# Patient Record
Sex: FEMALE | URBAN - METROPOLITAN AREA
[De-identification: names, ages, dates, MRNs, and addresses within clinical notes are randomized per-mention and may not be internally consistent; named-entity substitution may affect disease eponyms.]

---

## 2019-11-19 NOTE — TELEPHONE ENCOUNTER
RECORDS RECEIVED FROM: Self    DATE RECEIVED: 1/9/20   NOTES (FOR ALL VISITS) STATUS DETAILS   OFFICE NOTE from referring provider N/A    OFFICE NOTE from other specialist N/A    DISCHARGE SUMMARY from hospital N/A    DISCHARGE REPORT from the ER N/A    OPERATIVE REPORT N/A    MEDICATION LIST Care Everywhere    IMAGING  (FOR ALL VISITS)     EMG N/A    EEG N/A    ECT N/A    MRI (HEAD, NECK, SPINE) N/A    LUMBAR PUNCTURE N/A    MARTHA Scan N/A    CT (HEAD, NECK, SPINE) N/A       Phone Call:  11/19/19   Contact Name Ela Warren   Outcome Spoke with patient: Asked if patient has external neuro records. Patient stated she has no neurology records regarding tremors. She did see a neurologist for headaches awhile ago.     Patient stated she asked Dr. Asif Castellon who she should see for tremors and he suggested our movement disorders group here. Therefore patient is a self referral. No external records.

## 2019-12-16 PROBLEM — R25.1 TREMOR: Status: ACTIVE | Noted: 2019-12-16

## 2019-12-16 RX ORDER — BENZONATATE 100 MG/1
100 CAPSULE ORAL
COMMUNITY
Start: 2019-10-21 | End: 2020-01-09

## 2019-12-16 RX ORDER — IPRATROPIUM BROMIDE 42 UG/1
84 SPRAY, METERED NASAL
COMMUNITY
Start: 2019-10-21 | End: 2020-01-09

## 2019-12-17 NOTE — PROGRESS NOTES
Summary and Recommendations:     Essential Tremor  Trial of propranolol 10 mg tab as needed    Discussed diagnosis and management.    Will try propranolol 10mg as needed    Should go back to pcp to annual physical and blood work including TSH.    Discussed medication options, surgical options and devices (wrist devices/watch/etc) for tremor      Gordo Solis MD  _____________________________________________________________________  PATIENT: Ela Warren  49 year old female   : 1970  VIVIAN:     Consult requested by pcp/other        Outside records reviewed and revealed  - inserted.       History obtained from patient      History of Present Illness  49 year old yo right handed with tremor   Right handed and has more left than right hand tremor    Sister-in-law is a physician    Hearing is okay  Vision okay  Snores -   No talking   No change in her sense  Shaky handwriting    Balance has been okay -   Works out  Use to do yoga  Not clear if tremor is worse after exertion  Worse tremor after being hungry  1-2  Cups coffee per day  Sleep okay  Mood - none  Lungs okay  Heart has been okay  Her blood pressure has been low in the past and has had bp in the 80s and gets light headed easily  eg when standing up from yoga  Allergies - see list  Id - none  No family history of tremor.   msk - past issues  Running - stopped and improved.   Skin  - rosacea in the past. Resolved  Endo - ?prediabetic in the past and is no longer prediabetic  Thyroid and cholesterol ok     [facility]  - private clinic     - during the day   She is not typing or writing too much  Typing some - works [place].     Migraines are gone as of 10 years ago.   No family history of migraines.   On hormones and was started 2 years         Medications            Benzonatate tessalon 100mg Not taking       Cetirizine zyrtec Not taking       Cholecalciferol vitamin d3 1000 units  1       Cyclosporine restasis  Not  using       Estradiol  Not using       Estrogen con-medroxyprogesteroneprempro 0.3-1.5mg  Not using       Fluticasone flonase 50 mcg/act nasal spray As needed       Fyavolv 1-5-mcg tablet 1       Hydrocodone-acetaminophen norco 5-325gm Not using       Ibuprofen advil/motrin 600mg Not using       Ipratropium atrovent 0.06% nasal spray  Not using       Metronidazole metrogel 0.75 % gel Not using       MVI 1                                                                                                                     14 Review of systems  are negative except for   Patient Active Problem List   Diagnosis     iamLUMBAR DISC DISPLACEMENT     Nonallopathic lesion of lumbar region     Sprain and strain of hip and thigh     Hip joint pain     Ankle pain     Right hip pain     Tremor        Allergies   Allergen Reactions     Prochlorperazine Swelling, Difficulty breathing and Anaphylaxis     Throat swelled     Past Surgical History:   Procedure Laterality Date     BREAST SURGERY      Augmentation     DILATION AND CURETTAGE, OPERATIVE HYSTEROSCOPY WITH MORCELLATOR, COMBINED N/A 5/6/2016    Procedure: COMBINED DILATION AND CURETTAGE, OPERATIVE HYSTEROSCOPY WITH MORCELLATOR;  Surgeon: Ayse Nolasco MD;  Location: [facility]     GYN SURGERY      ENDOMETRIAL ABLATION, LAPAROSCOPY FOR ENDOMETRIOSIS     Past Medical History:   Diagnosis Date     Allergic rhinitis      Endometriosis      PCOS (polycystic ovarian syndrome)      Prediabetes      Rosacea      Seasonal allergies      Tremor 12/16/2019     Social History     Socioeconomic History     Marital status:      Spouse name: Not on file     Number of children: Not on file     Years of education: Not on file     Highest education level: Not on file   Occupational History     Not on file   Social Needs     Financial resource strain: Not on file     Food insecurity:     Worry: Not on file     Inability: Not on file     Transportation needs:     Medical: Not on  file     Non-medical: Not on file   Tobacco Use     Smoking status: Never Smoker     Smokeless tobacco: Never Used   Substance and Sexual Activity     Alcohol use: Yes     Comment: 2-4 times/ week     Drug use: No     Sexual activity: Not on file   Lifestyle     Physical activity:     Days per week: Not on file     Minutes per session: Not on file     Stress: Not on file   Relationships     Social connections:     Talks on phone: Not on file     Gets together: Not on file     Attends Mu-ism service: Not on file     Active member of club or organization: Not on file     Attends meetings of clubs or organizations: Not on file     Relationship status: Not on file     Intimate partner violence:     Fear of current or ex partner: Not on file     Emotionally abused: Not on file     Physically abused: Not on file     Forced sexual activity: Not on file   Other Topics Concern     Not on file   Social History Narrative     lives in [place] and venkat spouse     No family history on file.  Current Outpatient Medications   Medication Sig Dispense Refill     benzonatate (TESSALON) 100 MG capsule Take 100 mg by mouth       ipratropium (ATROVENT) 0.06 % nasal spray 84 mcg       Cetirizine HCl (ZYRTEC PO) Take  by mouth.       Cholecalciferol (VITAMIN D3 PO) Take 1,000 Units by mouth daily       CycloSPORINE (RESTASIS OP) Apply  to eye.       ESTRADIOL TD        estrogen conj-medroxyPROGESTERone (PREMPRO) 0.3-1.5 MG tablet Take 1 tablet by mouth       fluticasone (FLONASE) 50 MCG/ACT nasal spray Spray 2 sprays into both nostrils daily       HYDROcodone-acetaminophen (NORCO) 5-325 MG per tablet Take 1-2 tablets by mouth every 4 hours as needed for other (Moderate to Severe Pain) 15 tablet 0     ibuprofen (ADVIL,MOTRIN) 600 MG tablet Take 1 tablet (600 mg) by mouth every 6 hours as needed for pain (mild) 60 tablet 5     metroNIDAZOLE (METROGEL) 0.75 % gel Apply topically 2 times daily       multivitamin, therapeutic  with minerals (THERA-VIT-M) TABS Take 1 tablet by mouth daily       ORDER FOR DME Equipment being ordered: Tri[employer]ok ankle brace, medium 1 each 0     Examination  B/P: Data Unavailable, T: Data Unavailable, P: Data Unavailable, R: Data Unavailable 0 lbs 0 oz  There were no vitals taken for this visit., There is no height or weight on file to calculate BMI.    Vitals signs were added and reviewed if not above. Please refer to the chart from this visit.    General examination: well developed, nourished and normal affect  Carotid: No bruits. Chest CTA, Heart regular without gallops or murmurs. Abdomen soft nontender, no masses, bowel sounds intact. Periphery: normal pulses without edema. No skin lesions. MENTAL STATUS:  Alert, oriented x3.  Speech fluent with normal naming, repetition, comprehension.  Good right-left orientation, Can remember 3/3 objects.   CRANIAL NERVES:  Disks flat. Pupils are equal, round, reactive to light.  Normal vascularity and fields. Extraocular movements full.  Facial sensation and movement normal.  Hearing intact. Palate moves symmetrically.  Tongue midline.  Sternocleidomastoid and trapezius strength intact.  Neck strength was normal.  NEUROLOGIC:  Tone: normal. Motor in upper and lower extremities. 5/5.  Reflexes 2/4.  Toe signs downgoing.  Good finger-nose-finger, fine finger movement, heel-shin maneuver, sensation to light touch, position sense and vibration and temperature was normal. Gait normal. Romberg and postural stability normal Tremor  - mild bilateral postural and action tremor.        Patient Demographics  - 50 y.o. Female; born [birth year]   Patient Address Communication Language Race / Ethnicity Marital Status   [address] [place] [address] [phone] (Mobile)  [phone] (Home) English (Preferred) White / Not  or  Unknown     Allergies    Active Allergy Reactions Severity Noted Date Comments   Prochlorperazine Anaphylaxis    12/18/2014       Medications    Medication Sig Dispensed Refills Start Date End Date Status   conjugated estrogen-medroxyPROGESTERone 0.3-1.5 mg 0.3-1.5 mg tablet   Take 1 tablet by mouth once daily.   0     Active   ESTRADIOL TD   Apply to skin.   0     Active   benzonatate (TESSALON) 100 mg oral capsule   Take 1 capsule (100 mg) by mouth three times a day. 30 capsule   0 10/21/2019   Active   ipratropium 0.06% (ATROVENT) 42 mcg (0.06 %) Nasal Spray Spray nasal spray   Instill 2 sprays (84 mcg) into EACH nare three times a day. 15 mL   0 10/21/2019   Active     Active Problems    No known active problems      Encounters  - from Last 3 Months  Date Type Specialty Care Team Description   10/21/2019 Urgent Care Visit Urgent Care Saige Julio, PAJannaC   Viral URI with cough (Primary Dx)   10/21/2019 Travel           Social History    Tobacco Use Types Packs/Day Years Used Date   Never Smoker           Smokeless Tobacco: Never Used           Alcohol Use Drinks/Week oz/Week Comments   No           Sex Assigned at Birth Date Recorded   Not on file       Job Start Date Occupation Industry   Not on file Not on file Not on file     Travel History Travel Start Travel End   No recent travel history available.

## 2020-01-09 ENCOUNTER — PRE VISIT (OUTPATIENT)
Dept: NEUROLOGY | Facility: CLINIC | Age: 50
End: 2020-01-09

## 2020-01-09 ENCOUNTER — OFFICE VISIT (OUTPATIENT)
Dept: NEUROLOGY | Facility: CLINIC | Age: 50
End: 2020-01-09
Payer: COMMERCIAL

## 2020-01-09 VITALS
WEIGHT: 137.8 LBS | RESPIRATION RATE: 15 BRPM | HEIGHT: 69 IN | SYSTOLIC BLOOD PRESSURE: 134 MMHG | OXYGEN SATURATION: 96 % | BODY MASS INDEX: 20.41 KG/M2 | DIASTOLIC BLOOD PRESSURE: 80 MMHG | HEART RATE: 73 BPM

## 2020-01-09 DIAGNOSIS — R25.1 TREMOR: ICD-10-CM

## 2020-01-09 RX ORDER — PROPRANOLOL HYDROCHLORIDE 10 MG/1
TABLET ORAL
Qty: 90 TABLET | Refills: 3 | Status: SHIPPED | OUTPATIENT
Start: 2020-01-09 | End: 2021-02-17

## 2020-01-09 RX ORDER — NORETHINDRONE ACETATE AND ETHINYL ESTRADIOL .005; 1 MG/1; MG/1
TABLET, FILM COATED ORAL
COMMUNITY
Start: 2019-11-15

## 2020-01-09 ASSESSMENT — PAIN SCALES - GENERAL: PAINLEVEL: NO PAIN (0)

## 2020-01-09 ASSESSMENT — MIFFLIN-ST. JEOR: SCORE: 1306.5

## 2020-01-09 NOTE — NURSING NOTE
Chief Complaint   Patient presents with     Consult For     Tremors     P NEW MOVEMENT DISORDER BILATERAL HAND TREMORS, LEFT SIDE WORSE       Werner Wilkerson, EMT

## 2020-01-09 NOTE — LETTER
Date:January 10, 2020      Patient was self referred, no letter generated. Do not send.        [facility] Information

## 2020-01-09 NOTE — LETTER
2020       RE: Ela Warren  [address] [place] [address]     Dear Colleague,    Thank you for referring your patient, Ela Warren, to the [facility] NEUROLOGY at [facility]. Please see a copy of my visit note below.    Summary and Recommendations:     Essential Tremor  Trial of propranolol 10 mg tab as needed    Discussed diagnosis and management.    Will try propranolol 10mg as needed    Should go back to pcp to annual physical and blood work including TSH.    Discussed medication options, surgical options and devices (wrist devices/watch/etc) for tremor      Gordo Solis MD  _____________________________________________________________________  PATIENT: Ela Warren  49 year old female   : 1970  VIVIAN:     Consult requested by pcp/other        Outside records reviewed and revealed  - inserted.       History obtained from patient      History of Present Illness  49 year old yo right handed with tremor   Right handed and has more left than right hand tremor    Sister-in-law is a physician    Hearing is okay  Vision okay  Snores -   No talking   No change in her sense  Shaky handwriting    Balance has been okay -   Works out  Use to do yoga  Not clear if tremor is worse after exertion  Worse tremor after being hungry  1-2  Cups coffee per day  Sleep okay  Mood - none  Lungs okay  Heart has been okay  Her blood pressure has been low in the past and has had bp in the 80s and gets light headed easily  eg when standing up from yoga  Allergies - see list  Id - none  No family history of tremor.   msk - past issues  Running - stopped and improved.   Skin  - rosacea in the past. Resolved  Endo - ?prediabetic in the past and is no longer prediabetic  Thyroid and cholesterol okay     [facility]  - private clinic     - during the day   She is not typing or writing too much  Typing some - works downtown.      Migraines are gone as of 10 years ago.   No family history of migraines.   On hormones and was started 2 years         Medications            Benzonatate tessalon 100mg Not taking       Cetirizine zyrtec Not taking       Cholecalciferol vitamin d3 1000 units  1       Cyclosporine restasis  Not using       Estradiol  Not using       Estrogen con-medroxyprogesteroneprempro 0.3-1.5mg  Not using       Fluticasone flonase 50 mcg/act nasal spray As needed       Fyavolv 1-5-mcg tablet 1       Hydrocodone-acetaminophen norco 5-325gm Not using       Ibuprofen advil/motrin 600mg Not using       Ipratropium atrovent 0.06% nasal spray  Not using       Metronidazole metrogel 0.75 % gel Not using       MVI 1                                                                                                                     14 Review of systems  are negative except for   Patient Active Problem List   Diagnosis     iamLUMBAR DISC DISPLACEMENT     Nonallopathic lesion of lumbar region     Sprain and strain of hip and thigh     Hip joint pain     Ankle pain     Right hip pain     Tremor        Allergies   Allergen Reactions     Prochlorperazine Swelling, Difficulty breathing and Anaphylaxis     Throat swelled     Past Surgical History:   Procedure Laterality Date     BREAST SURGERY      Augmentation     DILATION AND CURETTAGE, OPERATIVE HYSTEROSCOPY WITH MORCELLATOR, COMBINED N/A 5/6/2016    Procedure: COMBINED DILATION AND CURETTAGE, OPERATIVE HYSTEROSCOPY WITH MORCELLATOR;  Surgeon: Ayse Nolasco MD;  Location: [facility]     GYN SURGERY      ENDOMETRIAL ABLATION, LAPAROSCOPY FOR ENDOMETRIOSIS     Past Medical History:   Diagnosis Date     Allergic rhinitis      Endometriosis      PCOS (polycystic ovarian syndrome)      Prediabetes      Rosacea      Seasonal allergies      Tremor 12/16/2019     Social History     Socioeconomic History     Marital status:      Spouse name: Not on file     Number of children: Not  on file     Years of education: Not on file     Highest education level: Not on file   Occupational History     Not on file   Social Needs     Financial resource strain: Not on file     Food insecurity:     Worry: Not on file     Inability: Not on file     Transportation needs:     Medical: Not on file     Non-medical: Not on file   Tobacco Use     Smoking status: Never Smoker     Smokeless tobacco: Never Used   Substance and Sexual Activity     Alcohol use: Yes     Comment: 2-4 times/ week     Drug use: No     Sexual activity: Not on file   Lifestyle     Physical activity:     Days per week: Not on file     Minutes per session: Not on file     Stress: Not on file   Relationships     Social connections:     Talks on phone: Not on file     Gets together: Not on file     Attends Mandaen service: Not on file     Active member of club or organization: Not on file     Attends meetings of clubs or organizations: Not on file     Relationship status: Not on file     Intimate partner violence:     Fear of current or ex partner: Not on file     Emotionally abused: Not on file     Physically abused: Not on file     Forced sexual activity: Not on file   Other Topics Concern     Not on file   Social History Narrative     lives in [place] and venkat spouse     No family history on file.  Current Outpatient Medications   Medication Sig Dispense Refill     benzonatate (TESSALON) 100 MG capsule Take 100 mg by mouth       ipratropium (ATROVENT) 0.06 % nasal spray 84 mcg       Cetirizine HCl (ZYRTEC PO) Take  by mouth.       Cholecalciferol (VITAMIN D3 PO) Take 1,000 Units by mouth daily       CycloSPORINE (RESTASIS OP) Apply  to eye.       ESTRADIOL TD        estrogen conj-medroxyPROGESTERone (PREMPRO) 0.3-1.5 MG tablet Take 1 tablet by mouth       fluticasone (FLONASE) 50 MCG/ACT nasal spray Spray 2 sprays into both nostrils daily       HYDROcodone-acetaminophen (NORCO) 5-325 MG per tablet Take 1-2 tablets by mouth every  4 hours as needed for other (Moderate to Severe Pain) 15 tablet 0     ibuprofen (ADVIL,MOTRIN) 600 MG tablet Take 1 tablet (600 mg) by mouth every 6 hours as needed for pain (mild) 60 tablet 5     metroNIDAZOLE (METROGEL) 0.75 % gel Apply topically 2 times daily       multivitamin, therapeutic with minerals (THERA-VIT-M) TABS Take 1 tablet by mouth daily       ORDER FOR DME Equipment being ordered: Tri[employer]ok ankle brace, medium 1 each 0     Examination  B/P: Data Unavailable, T: Data Unavailable, P: Data Unavailable, R: Data Unavailable 0 lbs 0 oz  There were no vitals taken for this visit., There is no height or weight on file to calculate BMI.    Vitals signs were added and reviewed if not above. Please refer to the chart from this visit.    General examination: well developed, nourished and normal affect  Carotid: No bruits. Chest CTA, Heart regular without gallops or murmurs. Abdomen soft nontender, no masses, bowel sounds intact. Periphery: normal pulses without edema. No skin lesions. MENTAL STATUS:  Alert, oriented x3.  Speech fluent with normal naming, repetition, comprehension.  Good right-left orientation, Can remember 3/3 objects.   CRANIAL NERVES:  Disks flat. Pupils are equal, round, reactive to light.  Normal vascularity and fields. Extraocular movements full.  Facial sensation and movement normal.  Hearing intact. Palate moves symmetrically.  Tongue midline.  Sternocleidomastoid and trapezius strength intact.  Neck strength was normal.  NEUROLOGIC:  Tone: normal. Motor in upper and lower extremities. 5/5.  Reflexes 2/4.  Toe signs downgoing.  Good finger-nose-finger, fine finger movement, heel-shin maneuver, sensation to light touch, position sense and vibration and temperature was normal. Gait normal. Romberg and postural stability normal Tremor  - mild bilateral postural and action tremor.        Patient Demographics  - 50 y.o. Female; born [birth year]   Patient Address Communication  Language Race / Ethnicity Marital Status   [address] [place] [address] [phone] (Mobile)  [phone] (Home) English (Preferred) White / Not  or  Unknown     Allergies    Active Allergy Reactions Severity Noted Date Comments   Prochlorperazine Anaphylaxis   12/18/2014       Medications    Medication Sig Dispensed Refills Start Date End Date Status   conjugated estrogen-medroxyPROGESTERone 0.3-1.5 mg 0.3-1.5 mg tablet   Take 1 tablet by mouth once daily.   0     Active   ESTRADIOL TD   Apply to skin.   0     Active   benzonatate (TESSALON) 100 mg oral capsule   Take 1 capsule (100 mg) by mouth three times a day. 30 capsule   0 10/21/2019   Active   ipratropium 0.06% (ATROVENT) 42 mcg (0.06 %) Nasal Spray Spray nasal spray   Instill 2 sprays (84 mcg) into EACH nare three times a day. 15 mL   0 10/21/2019   Active     Active Problems    No known active problems      Encounters  - from Last 3 Months  Date Type Specialty Care Team Description   10/21/2019 Urgent Care Visit Urgent Care Saige Julio, PAJannaC   Viral URI with cough (Primary Dx)   10/21/2019 Travel           Social History    Tobacco Use Types Packs/Day Years Used Date   Never Smoker           Smokeless Tobacco: Never Used           Alcohol Use Drinks/Week oz/Week Comments   No           Sex Assigned at Birth Date Recorded   Not on file       Job Start Date Occupation Industry   Not on file Not on file Not on file     Travel History Travel Start Travel End   No recent travel history available.             Again, thank you for allowing me to participate in the care of your patient.      Sincerely,    Gordo Solis MD

## 2021-02-16 DIAGNOSIS — R25.1 TREMOR: ICD-10-CM

## 2021-02-17 RX ORDER — HYDROCODONE BITARTRATE AND ACETAMINOPHEN 5; 325 MG/1; MG/1
TABLET ORAL
COMMUNITY
Start: 2020-10-27

## 2021-02-17 RX ORDER — ADAPALENE GEL USP, 0.3% 3 MG/G
GEL TOPICAL
COMMUNITY
Start: 2020-07-29

## 2021-02-17 RX ORDER — PROPRANOLOL HYDROCHLORIDE 10 MG/1
TABLET ORAL
Qty: 90 TABLET | Refills: 0 | Status: SHIPPED | OUTPATIENT
Start: 2021-02-17

## 2021-02-17 RX ORDER — BIMATOPROST 3 UG/ML
SOLUTION TOPICAL
COMMUNITY
Start: 2020-07-27

## 2021-02-17 RX ORDER — KETOROLAC TROMETHAMINE 10 MG/1
TABLET, FILM COATED ORAL
COMMUNITY
Start: 2020-10-27

## 2021-02-17 RX ORDER — NORETHINDRONE ACETATE AND ETHINYL ESTRADIOL .005; 1 MG/1; MG/1
TABLET, FILM COATED ORAL
COMMUNITY
Start: 2020-08-14

## 2021-02-17 NOTE — TELEPHONE ENCOUNTER
Rx Authorization:    Requested Medication/ Dose PROPRANOLOL 10 MG TABLET    Date last refill ordered: 1/9/2020    Quantity ordered: 90 tabs    # refills: 3    Date of last clinic visit with ordering provider: 1/9/2020    Date of next clinic visit with ordering provider:     All pertinent protocol data (lab date/result):     Include pertinent information from patients message:

## 2021-02-17 NOTE — TELEPHONE ENCOUNTER
Will send a 3 month supply to be signed by Dr. Solis with a note stating no more refills until she makes an appointment.